# Patient Record
Sex: MALE | Race: WHITE | NOT HISPANIC OR LATINO | Employment: UNEMPLOYED | URBAN - METROPOLITAN AREA
[De-identification: names, ages, dates, MRNs, and addresses within clinical notes are randomized per-mention and may not be internally consistent; named-entity substitution may affect disease eponyms.]

---

## 2019-08-05 LAB
ALT SERPL-CCNC: 19 U/L (ref 13–61)
AST SERPL-CCNC: 17 U/L (ref 15–37)
CREATININE (EXTERNAL): 1 MG/DL (ref 0.7–1.2)
GFR ESTIMATED (EXTERNAL): >60 ML/MIN/1.73M2
GLUCOSE (EXTERNAL): 85 MG/DL (ref 74–106)
POTASSIUM (EXTERNAL): 4.1 MMOL/L (ref 3.5–5)

## 2020-02-27 LAB
CREATININE (EXTERNAL): 1.2 MG/DL (ref 0.7–1.2)
GFR ESTIMATED (EXTERNAL): >60 ML/MIN/1.73M2
GLUCOSE (EXTERNAL): 90 MG/DL (ref 74–106)
POTASSIUM (EXTERNAL): 4.1 MMOL/L (ref 3.5–5)

## 2020-07-02 LAB
CREATININE (EXTERNAL): 1 MG/DL (ref 0.7–1.2)
GFR ESTIMATED (EXTERNAL): >60 ML/MIN/1.73M2
GLUCOSE (EXTERNAL): 87 MG/DL (ref 74–106)
POTASSIUM (EXTERNAL): 4.7 MMOL/L (ref 3.5–5)

## 2020-07-22 LAB — HIV 1&2 EXT: NORMAL

## 2021-06-07 LAB
CREATININE (EXTERNAL): 0.9 MG/DL (ref 0.7–1.2)
GFR ESTIMATED (EXTERNAL): >60 ML/MIN/1.73M2
GLUCOSE (EXTERNAL): 91 MG/DL (ref 74–106)
POTASSIUM (EXTERNAL): 4 MMOL/L (ref 3.5–5)

## 2021-08-23 ENCOUNTER — TRANSFERRED RECORDS (OUTPATIENT)
Dept: HEALTH INFORMATION MANAGEMENT | Facility: CLINIC | Age: 61
End: 2021-08-23

## 2022-07-18 LAB
CHOLESTEROL (EXTERNAL): 213 MG/DL (ref 0–200)
CREATININE (EXTERNAL): 1 MG/DL (ref 0.7–1.2)
GFR ESTIMATED (EXTERNAL): 86 ML/MIN/1.73M2
GLUCOSE (EXTERNAL): 97 MG/DL (ref 74–106)
HDLC SERPL-MCNC: 43 MG/DL
LDL CHOLESTEROL CALCULATED (EXTERNAL): 137 MG/DL
NON HDL CHOLESTEROL (EXTERNAL): 170 MG/DL
POTASSIUM (EXTERNAL): 4.3 MMOL/L (ref 3.5–5)
TRIGLYCERIDES (EXTERNAL): 166 MG/DL

## 2023-01-09 ENCOUNTER — TRANSFERRED RECORDS (OUTPATIENT)
Dept: HEALTH INFORMATION MANAGEMENT | Facility: CLINIC | Age: 63
End: 2023-01-09

## 2023-01-09 LAB
CREATININE (EXTERNAL): 1 MG/DL (ref 0.7–1.2)
GFR ESTIMATED (EXTERNAL): 85 ML/MIN/1.73M2
GLUCOSE (EXTERNAL): 99 MG/DL (ref 74–106)
INR (EXTERNAL): 1 (ref 0.8–1.1)
POTASSIUM (EXTERNAL): 4.9 MMOL/L (ref 3.5–5)

## 2023-03-22 ENCOUNTER — TRANSFERRED RECORDS (OUTPATIENT)
Dept: HEALTH INFORMATION MANAGEMENT | Facility: CLINIC | Age: 63
End: 2023-03-22

## 2023-05-14 ENCOUNTER — TRANSFERRED RECORDS (OUTPATIENT)
Dept: HEALTH INFORMATION MANAGEMENT | Facility: CLINIC | Age: 63
End: 2023-05-14

## 2023-06-02 ENCOUNTER — TRANSCRIBE ORDERS (OUTPATIENT)
Dept: OTHER | Age: 63
End: 2023-06-02

## 2023-06-02 DIAGNOSIS — K62.82 DYSPLASIA OF ANUS: Primary | ICD-10-CM

## 2023-06-05 ENCOUNTER — TRANSFERRED RECORDS (OUTPATIENT)
Dept: HEALTH INFORMATION MANAGEMENT | Facility: CLINIC | Age: 63
End: 2023-06-05

## 2023-06-05 ENCOUNTER — TELEPHONE (OUTPATIENT)
Dept: OTHER | Age: 63
End: 2023-06-05

## 2023-06-05 ENCOUNTER — PRE VISIT (OUTPATIENT)
Dept: SURGERY | Facility: CLINIC | Age: 63
End: 2023-06-05

## 2023-06-05 ENCOUNTER — TELEPHONE (OUTPATIENT)
Dept: SURGERY | Facility: CLINIC | Age: 63
End: 2023-06-05

## 2023-06-05 NOTE — TELEPHONE ENCOUNTER
[facility] Call Center    Phone Message    May a detailed message be left on voicemail: yes     Reason for Call: Appointment Intake    Referring Provider Name: Lalo Salter @ [facility]  Diagnosis and/or Symptoms: Dysplasia of anus [K62.82]    Referring provider requesting Dr. Guadalupe - high resolution anoscopy for identification and treatment of anal high grade dysplasia    Sending TE per guidelines    Action Taken: Message routed to:  [facility] CRS    Travel Screening: Not Applicable

## 2023-06-05 NOTE — TELEPHONE ENCOUNTER
Diagnosis, Referred by & from: Microscopy   Appt date: N/A yet   NOTES STATUS DETAILS   OFFICE NOTE from referring provider Received [facility]:  4/24/23 - CR OV with Dr. Salter   OFFICE NOTE from other specialist Received [facility]:  7/6/22 - CR OV with Dr. Garcia  8/26/21 - URO OV with Dr. Bird   DISCHARGE SUMMARY from hospital N/A    DISCHARGE REPORT from the ER N/A    OPERATIVE REPORT Received [facility]:  3/22/23 - OP Note for EXCISION OF PERIANAL LESION with Dr. Zaldivar  1/31/23 - OP Note for FULGERATION OF ANAL CONDYLOMA WITH BIOPSY with Dr Zaldivar  6/9/21 - OP Note for FULGERATION OF ANAL CONDYLOMA with Dr. Thomas  7/7/20 - OP Note for EXCISIONAL BIOPSY OF ANAL CONDYLOMA with Dr. Jensen  3/11/20 - OP Note for FULGERATION OF ANAL CONDYLOMA with Dr. Woodruff   MEDICATION LIST Received    LABS     ANAL PAP/CEA Received [facility]:  7/24/20 - Anal Pap (Case: BF-KH-)   BIOPSIES/PATHOLOGY RELATED TO DIAGNOSIS Received [facility]:  3/22/23 - Anal Biopsy (Case: NT-BY-)  1/31/23 - Anal Biopsy (Case: NA-RM-)  9/16/21 - Colon Biopsy (Case: BP-CT-)  7/21/21 - Anal Biopsy (Case: NO-TJ-)  6/9/21 - Anal Biopsy (Case: BZ-XO-)  7/7/20 - Anal Biopsy (Case: BS-ZI-)  3/11/20 - Anal Biopsy (Case: ZA-TB-)   DIAGNOSTIC PROCEDURES     PFC TESTING (from the Pelvic floor center includes Manometry, PDNL, EMG, etc.) N/A    COLONOSCOPY Received [facility]:  9/16/21 - Colonoscopy   UPPER ENDOSCOPY (EGD) N/A    FLEX SIGMOIDOSCOPY N/A    ERCP N/A    IMAGING (DISC & REPORT)      CT N/A    MRI N/A    XRAY N/A    ULTRASOUND  (ENDOANAL/ENDORECTAL) N/A      Records Requested  06/05/23    [facility]  Fax: [phone]   Outcome * 6/5/23 9:17 AM Faxed urg req to [facility] for records to be faxed to the clinic. - Ryann    * 6/5/23 1:12 PM Records received from [facility] and sent to HIM to be scanned into the chart. - Ryann

## 2023-06-05 NOTE — TELEPHONE ENCOUNTER
LVM for scheduling  With: Eugenie Barger NP   Referring Provider: Dr. Salter   For / Appt Notes[facility]   Appt Type: Microscopy   Appt Date/Time: next available

## 2023-08-18 ENCOUNTER — TELEPHONE (OUTPATIENT)
Dept: SURGERY | Facility: CLINIC | Age: 63
End: 2023-08-18

## 2023-08-25 ENCOUNTER — OFFICE VISIT (OUTPATIENT)
Dept: SURGERY | Facility: CLINIC | Age: 63
End: 2023-08-25
Attending: SURGERY
Payer: COMMERCIAL

## 2023-08-25 DIAGNOSIS — A63.0 ANAL CONDYLOMA: Primary | ICD-10-CM

## 2023-08-25 DIAGNOSIS — K62.82 DYSPLASIA OF ANUS: ICD-10-CM

## 2023-08-25 LAB
LAB DIRECTOR COMMENTS: ABNORMAL
LAB DIRECTOR DISCLAIMER: ABNORMAL
LAB DIRECTOR INTERPRETATION: ABNORMAL
LAB DIRECTOR METHODOLOGY: ABNORMAL
LAB DIRECTOR RESULTS: ABNORMAL
SPECIMEN DESCRIPTION: ABNORMAL

## 2023-08-25 PROCEDURE — 88305 TISSUE EXAM BY PATHOLOGIST: CPT | Mod: 26 | Performed by: PATHOLOGY

## 2023-08-25 PROCEDURE — 88305 TISSUE EXAM BY PATHOLOGIST: CPT | Mod: TC | Performed by: NURSE PRACTITIONER

## 2023-08-25 PROCEDURE — 88112 CYTOPATH CELL ENHANCE TECH: CPT | Mod: TC | Performed by: NURSE PRACTITIONER

## 2023-08-25 PROCEDURE — 99203 OFFICE O/P NEW LOW 30 MIN: CPT | Mod: 25 | Performed by: NURSE PRACTITIONER

## 2023-08-25 PROCEDURE — 88112 CYTOPATH CELL ENHANCE TECH: CPT | Mod: 26 | Performed by: PATHOLOGY

## 2023-08-25 PROCEDURE — 46910 DESTRUCTION ANAL LESION(S): CPT | Performed by: NURSE PRACTITIONER

## 2023-08-25 PROCEDURE — G0452 MOLECULAR PATHOLOGY INTERPR: HCPCS | Mod: 26 | Performed by: STUDENT IN AN ORGANIZED HEALTH CARE EDUCATION/TRAINING PROGRAM

## 2023-08-25 PROCEDURE — 46607 DIAGNOSTIC ANOSCOPY & BIOPSY: CPT | Performed by: NURSE PRACTITIONER

## 2023-08-25 PROCEDURE — 87624 HPV HI-RISK TYP POOLED RSLT: CPT | Performed by: NURSE PRACTITIONER

## 2023-08-25 RX ORDER — LIDOCAINE HYDROCHLORIDE 20 MG/ML
JELLY TOPICAL ONCE
Status: COMPLETED | OUTPATIENT
Start: 2023-08-25 | End: 2023-08-25

## 2023-08-25 RX ADMIN — LIDOCAINE HYDROCHLORIDE 1 TUBE: 20 JELLY TOPICAL at 12:51

## 2023-08-25 NOTE — LETTER
2023       RE: Damir Chase  [address] [address]     Dear Colleague,    Thank you for referring your patient, Damir Chase, to the [facility] RECTAL [facility]GERY [facility] [place] at [facility]. Please see a copy of my visit note below.    Colon and Rectal Surgery Clinic High Resolution Anoscopy Note    RE: Damir Chase  : 1960  ERASTO: 2023      Damir Chase is a 63 year old male with a history of anal warts/LSIL who presents today for high resolution anoscopy.     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ianal [facility] that showed condyloma/LSIL. He denies any immunosuppression. He does not smoke. No anal intercourse.    ASSESSMENT: Written, informed consent was obtained prior to procedure.  Prior to the start of the procedure and with procedural staff participation, I verbally confirmed the patient s identity using two indicators, relevant allergies, that the procedure was appropriate and matched the consent or emergent situation, and that the correct equipment/implants were available. Immediately prior to starting the procedure I conducted the Time Out with the procedural staff and re-confirmed the patient s name, procedure, and site/side. (The Joint Commission universal protocol was followed.)  Yes    Sedation (Moderate or Deep): None    Anal cytology was obtained with Dacron swab. Digital anal rectal exam was performed with no palpable lesions. Dilute acetic acid soak was completed for 2 minutes. Lubricant was used to insert the anoscope. Performed high resolution microscopy using the Proctostation. The anal transition zone was viewed in its entirety. Abnormal areas noted were two small verruciform lesions with acetowhitening in the left posterior and left anterior perianal positions. No intra anal abnormalities.  After injection with 1% lidocaine with epinephrine, biopsies were obtained in the left posterior and left anterior perianal positions using a baby Tischler forceps. Entire lesion was destroyed with cautery.   The perianal area was inspected after acetic acid soak. The findings noted were no additional acetowhitening or punctation.    The patient tolerated the procedures well.    PLAN: Will follow up with patient with results of biopsies, anal Pap, and HPV genotyping today for follow up plan.      For details of past medical history, surgical history, family history, medications, allergies, and review of systems, please see details below.    Medical history:  Past Medical History:   Diagnosis Date    Depression        Surgical history:  No past surgical history on file.    Family history:  No family history on file.    Medications:  Current Outpatient Medications   Medication Sig Dispense Refill    finasteride (PROSCAR) 5 MG tablet Take 5 mg by mouth daily.      Ibuprofen (IBU PO) Take  by mouth as needed.      terazosin (HYTRIN) 2 MG capsule Take 2 mg by mouth At Bedtime.       Allergies:  The patienthas No Known Allergies.    Social history:  Social History     Tobacco Use    Smoking status: Never    Smokeless tobacco: Never   Substance Use Topics    Alcohol use: Yes     Comment: very rare     Marital status: single.    Review of Systems:  There are no exam notes on file for this visit.     This procedure was performed under a collaborative agreement with Dr. Kalin Carlson MD, Chief of the Division of Colon and Rectal Surgery      This note was created using speech recognition software and may contain unintended word substitutions.          Again, thank you for allowing me to participate in the care of your patient.      Sincerely,    MARIANO Iyer CNP

## 2023-08-25 NOTE — PROGRESS NOTES
Colon and Rectal Surgery Clinic High Resolution Anoscopy Note    RE: Damir Chase  : 1960  ERASTO: 2023      Damir Chase is a 63 year old male with a history of anal warts/LSIL who presents today for high resolution anoscopy.     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ian[facility] that showed condyloma/LSIL. He denies any immunosuppression. He does not smoke. No anal intercourse.    ASSESSMENT: Written, informed consent was obtained prior to procedure.  Prior to the start of the procedure and with procedural staff participation, I verbally confirmed the patient s identity using two indicators, relevant allergies, that the procedure was appropriate and matched the consent or emergent situation, and that the correct equipment/implants were available. Immediately prior to starting the procedure I conducted the Time Out with the procedural staff and re-confirmed the patient s name, procedure, and site/side. (The Joint Commission universal protocol was followed.)  Yes    Sedation (Moderate or Deep): None    Anal cytology was obtained with Dacron swab. Digital anal rectal exam was performed with no palpable lesions. Dilute acetic acid soak was completed for 2 minutes. Lubricant was used to insert the anoscope. Performed high resolution microscopy using the Proctostation. The anal transition zone was viewed in its entirety. Abnormal areas noted were two small verruciform lesions with acetowhitening in the left posterior and left anterior perianal positions. No intra anal abnormalities. After injection with 1% lidocaine with epinephrine, biopsies were obtained in the left posterior and left anterior perianal positions using a baby Tischler forceps. Entire lesion was destroyed with cautery.   The perianal area was inspected after acetic acid soak. The findings noted were no additional acetowhitening or punctation.    The  patient tolerated the procedures well.    PLAN: Will follow up with patient with results of biopsies, anal Pap, and HPV genotyping today for follow up plan.      For details of past medical history, surgical history, family history, medications, allergies, and review of systems, please see details below.    Medical history:  Past Medical History:   Diagnosis Date    Depression        Surgical history:  No past surgical history on file.    Family history:  No family history on file.    Medications:  Current Outpatient Medications   Medication Sig Dispense Refill    finasteride (PROSCAR) 5 MG tablet Take 5 mg by mouth daily.      Ibuprofen (IBU PO) Take  by mouth as needed.      terazosin (HYTRIN) 2 MG capsule Take 2 mg by mouth At Bedtime.       Allergies:  The patienthas No Known Allergies.    Social history:  Social History     Tobacco Use    Smoking status: Never    Smokeless tobacco: Never   Substance Use Topics    Alcohol use: Yes     Comment: very rare     Marital status: single.    Review of Systems:  There are no exam notes on file for this visit.     This procedure was performed under a collaborative agreement with Dr. Kalin Carlson MD, Chief of the Division of Colon and Rectal Surgery    Eugenie Marroquin NP-RYAN  Colon and Rectal Surgery  [facility]      This note was created using speech recognition software and may contain unintended word substitutions.

## 2023-08-28 LAB
PATH REPORT.COMMENTS IMP SPEC: NORMAL
PATH REPORT.FINAL DX SPEC: NORMAL
PATH REPORT.FINAL DX SPEC: NORMAL
PATH REPORT.GROSS SPEC: NORMAL
PATH REPORT.GROSS SPEC: NORMAL
PATH REPORT.MICROSCOPIC SPEC OTHER STN: NORMAL
PATH REPORT.MICROSCOPIC SPEC OTHER STN: NORMAL
PATH REPORT.RELEVANT HX SPEC: NORMAL
PATH REPORT.RELEVANT HX SPEC: NORMAL
PHOTO IMAGE: NORMAL

## 2023-09-15 ENCOUNTER — TELEPHONE (OUTPATIENT)
Dept: SURGERY | Facility: CLINIC | Age: 63
End: 2023-09-15

## 2023-09-15 NOTE — TELEPHONE ENCOUNTER
I received a call from the patient asking about results from HRA with [facility] [place]. Provider and staff had been attempting to call patient but neither phone number listed worked (no updated). After updating contact information for the patient, I answered all questions regarding the HRA appointment/anal pap/ hpv genotyping. Patient's questions were all answered to his satisfaction. Patient was scheduled for an annual HRA with [facility].

## 2024-06-25 ENCOUNTER — TELEPHONE (OUTPATIENT)
Dept: SURGERY | Facility: CLINIC | Age: 64
End: 2024-06-25

## 2024-06-25 NOTE — TELEPHONE ENCOUNTER
Left Voicemail (1st Attempt), no myc for the patient to call back and schedule the following:    Appointment type: Microscopy  Provider: Eugenie Barger  Return date: Reschedule 9/13 Appt  Specialty phone number: [phone]  Additional appointment(s) needed: n/a  Additonal Notes: Reschedule to next available appt    Left direct #

## 2024-06-27 NOTE — TELEPHONE ENCOUNTER
Pt called back to reschedule    Patient confirmed scheduled appointment:  Date: 9/6/24  Time: 11:45 am  Visit type: Microscopy  Provider: Eugenie Barger  Location: [facility]  Testing/imaging: n/a  Additional notes: rescheduled 9/13 appt

## 2024-08-13 ENCOUNTER — MYC MEDICAL ADVICE (OUTPATIENT)
Dept: SURGERY | Facility: CLINIC | Age: 64
End: 2024-08-13

## 2024-09-06 ENCOUNTER — OFFICE VISIT (OUTPATIENT)
Dept: SURGERY | Facility: CLINIC | Age: 64
End: 2024-09-06
Payer: COMMERCIAL

## 2024-09-06 VITALS — DIASTOLIC BLOOD PRESSURE: 94 MMHG | SYSTOLIC BLOOD PRESSURE: 150 MMHG | OXYGEN SATURATION: 98 % | HEART RATE: 55 BPM

## 2024-09-06 DIAGNOSIS — K62.82 DYSPLASIA OF ANUS: Primary | ICD-10-CM

## 2024-09-06 PROCEDURE — 99213 OFFICE O/P EST LOW 20 MIN: CPT | Mod: 25 | Performed by: NURSE PRACTITIONER

## 2024-09-06 PROCEDURE — 87624 HPV HI-RISK TYP POOLED RSLT: CPT | Performed by: NURSE PRACTITIONER

## 2024-09-06 PROCEDURE — 88305 TISSUE EXAM BY PATHOLOGIST: CPT | Mod: TC | Performed by: NURSE PRACTITIONER

## 2024-09-06 PROCEDURE — 46607 DIAGNOSTIC ANOSCOPY & BIOPSY: CPT | Performed by: NURSE PRACTITIONER

## 2024-09-06 PROCEDURE — 88112 CYTOPATH CELL ENHANCE TECH: CPT | Mod: TC | Performed by: NURSE PRACTITIONER

## 2024-09-06 PROCEDURE — G0452 MOLECULAR PATHOLOGY INTERPR: HCPCS | Mod: 26 | Performed by: PATHOLOGY

## 2024-09-06 PROCEDURE — 88305 TISSUE EXAM BY PATHOLOGIST: CPT | Mod: 26 | Performed by: PATHOLOGY

## 2024-09-06 PROCEDURE — 88112 CYTOPATH CELL ENHANCE TECH: CPT | Mod: 26 | Performed by: PATHOLOGY

## 2024-09-06 PROCEDURE — 46910 DESTRUCTION ANAL LESION(S): CPT | Performed by: NURSE PRACTITIONER

## 2024-09-06 RX ORDER — PRAVASTATIN SODIUM 40 MG
1 TABLET ORAL EVERY EVENING
COMMUNITY

## 2024-09-06 RX ORDER — PRAMIPEXOLE DIHYDROCHLORIDE 0.5 MG/1
0.5 TABLET ORAL
COMMUNITY

## 2024-09-06 RX ORDER — LIDOCAINE HYDROCHLORIDE 20 MG/ML
JELLY TOPICAL ONCE
Status: COMPLETED | OUTPATIENT
Start: 2024-09-06 | End: 2024-09-06

## 2024-09-06 RX ORDER — IODINE AND POTASSIUM IODIDE 50; 100 MG/ML; MG/ML
LIQUID ORAL ONCE
Status: COMPLETED | OUTPATIENT
Start: 2024-09-06 | End: 2024-09-06

## 2024-09-06 RX ORDER — TAMSULOSIN HYDROCHLORIDE 0.4 MG/1
1 CAPSULE ORAL EVERY EVENING
COMMUNITY

## 2024-09-06 RX ADMIN — LIDOCAINE HYDROCHLORIDE: 20 JELLY TOPICAL at 12:14

## 2024-09-06 RX ADMIN — IODINE AND POTASSIUM IODIDE 1 ML: 50; 100 LIQUID ORAL at 12:14

## 2024-09-06 ASSESSMENT — PAIN SCALES - GENERAL: PAINLEVEL: MILD PAIN (2)

## 2024-09-06 NOTE — PROGRESS NOTES
Colon and Rectal Surgery Clinic High Resolution Anoscopy Note    RE: Damir Chase  : 1960  ERASTO: 2024    Damir Chase is a 64 year old male with a history of anal warts/LSIL who presents today for high resolution anoscopy.     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 of [facility]ian[facility] that showed condyloma/LSIL. I saw him last for HRA on 23 with biopsies showing AIN 1 and anal Pap with low risk HPV 6.   He denies any immunosuppression. He does not smoke. No anal intercourse.    ASSESSMENT: Written, informed consent was obtained prior to procedure.  Prior to the start of the procedure and with procedural staff participation, I verbally confirmed the patient s identity using two indicators, relevant allergies, that the procedure was appropriate and matched the consent or emergent situation, and that the correct equipment/implants were available. Immediately prior to starting the procedure I conducted the Time Out with the procedural staff and re-confirmed the patient s name, procedure, and site/side. (The Joint Commission universal protocol was followed.)  Yes    Sedation (Moderate or Deep): None    Anal cytology was obtained with Dacron swab. Digital anal rectal exam was performed with no palpable lesions. Dilute acetic acid soak was completed for 2 minutes. Lubricant was used to insert the anoscope. Performed high resolution microscopy using the Proctostation. The anal transition zone was viewed in its entirety. Abnormal areas noted were two small verruciform lesions in the right and left posterior positions in the distal anal canal. Some acetowhitening with punctation in the left posterior anal position at the anal verge and verruciform lesion in the right anterior perianal position. After injection with 1% lidocaine with epinephrine, biopsies were obtained in the left and right posterior positions using a baby  Tischler forceps and these lesions were destroyed with cautery. Biopsy was obtained in the left anterior anal verge and hemostasis was obtained using Monsel solution-this area was not destroyed as it was more diffuse. Additional biopsy obtained in the right anterior perianal position and this lesion was destroyed completely with cautery.  The perianal area was inspected after acetic acid soak. The findings noted were no additional acetowhitening or punctation.    The patient tolerated the procedures well.    PLAN: Will follow up with patient with results of biopsies, anal Pap, and HPV genotyping today for follow up plan. Patient's questions were answered to his stated satisfaction and he is in agreement with this plan.     For details of past medical history, surgical history, family history, medications, allergies, and review of systems, please see details below.    Medical history:  Past Medical History:   Diagnosis Date    Depression        Surgical history:  No past surgical history on file.    Family history:  No family history on file.    Medications:  Current Outpatient Medications   Medication Sig Dispense Refill    finasteride (PROSCAR) 5 MG tablet Take 5 mg by mouth daily.      Ibuprofen (IBU PO) Take  by mouth as needed.      terazosin (HYTRIN) 2 MG capsule Take 2 mg by mouth At Bedtime.       Allergies:  The patienthas No Known Allergies.    Social history:  Social History     Tobacco Use    Smoking status: Never    Smokeless tobacco: Never   Substance Use Topics    Alcohol use: Yes     Comment: very rare     Marital status: single.    Review of Systems:  There are no exam notes on file for this visit.     This procedure was performed under a collaborative agreement with Dr. Kalin Carlson MD, Chief of the Division of Colon and Rectal Surgery    Eugenie Marroquin, NP-C  Colon and Rectal Surgery  [facility]      This note was created using speech recognition software  and may contain unintended word substitutions.

## 2024-09-06 NOTE — LETTER
2024       RE: Damir Chase  [address]  [place] [address]     Dear Colleague,    Thank you for referring your patient, Damir Chase, to the [facility] COLON AND RECTAL SURGERY CLINIC [place] at [facility]. Please see a copy of my visit note below.    Colon and Rectal Surgery Clinic High Resolution Anoscopy Note    RE: Damir Chase  : 1960  ERASTO: 2024    Damir Chase is a 64 year old male with a history of anal warts/LSIL who presents today for high resolution anoscopy.     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 of [facility]ian[facility] lesion that showed condyloma/LSIL. I saw him last for HRA on 23 with biopsies showing AIN 1 and anal Pap with low risk HPV 6.   He denies any immunosuppression. He does not smoke. No anal intercourse.    ASSESSMENT: Written, informed consent was obtained prior to procedure.  Prior to the start of the procedure and with procedural staff participation, I verbally confirmed the patient s identity using two indicators, relevant allergies, that the procedure was appropriate and matched the consent or emergent situation, and that the correct equipment/implants were available. Immediately prior to starting the procedure I conducted the Time Out with the procedural staff and re-confirmed the patient s name, procedure, and site/side. (The Joint Commission universal protocol was followed.)  Yes    Sedation (Moderate or Deep): None    Anal cytology was obtained with Dacron swab. Digital anal rectal exam was performed with no palpable lesions. Dilute acetic acid soak was completed for 2 minutes. Lubricant was used to insert the anoscope. Performed high resolution microscopy using the Proctostation. The anal transition zone was viewed in its entirety. Abnormal areas noted were two small verruciform lesions in the right  and left posterior positions in the distal anal canal. Some acetowhitening with punctation in the left posterior anal position at the anal verge and verruciform lesion in the right anterior perianal position. After injection with 1% lidocaine with epinephrine, biopsies were obtained in the left and right posterior positions using a baby Tischler forceps and these lesions were destroyed with cautery. Biopsy was obtained in the left anterior anal verge and hemostasis was obtained using Monsel solution-this area was not destroyed as it was more diffuse. Additional biopsy obtained in the right anterior perianal position and this lesion was destroyed completely with cautery.  The perianal area was inspected after acetic acid soak. The findings noted were no additional acetowhitening or punctation.    The patient tolerated the procedures well.    PLAN: Will follow up with patient with results of biopsies, anal Pap, and HPV genotyping today for follow up plan. Patient's questions were answered to his stated satisfaction and he is in agreement with this plan.     For details of past medical history, surgical history, family history, medications, allergies, and review of systems, please see details below.    Medical history:  Past Medical History:   Diagnosis Date     Depression        Surgical history:  No past surgical history on file.    Family history:  No family history on file.    Medications:  Current Outpatient Medications   Medication Sig Dispense Refill     finasteride (PROSCAR) 5 MG tablet Take 5 mg by mouth daily.       Ibuprofen (IBU PO) Take  by mouth as needed.       terazosin (HYTRIN) 2 MG capsule Take 2 mg by mouth At Bedtime.       Allergies:  The patienthas No Known Allergies.    Social history:  Social History     Tobacco Use     Smoking status: Never     Smokeless tobacco: Never   Substance Use Topics     Alcohol use: Yes     Comment: very rare     Marital status: single.    Review of Systems:  There are  no exam notes on file for this visit.     This procedure was performed under a collaborative agreement with Dr. Kalin Carlson MD, Chief of the Division of Colon and Rectal Surgery    Eugenie Marroquin NP-C  Colon and Rectal Surgery  [facility]      This note was created using speech recognition software and may contain unintended word substitutions.      [facility] Informed Consent Process Documentation    Study Name: High Resolution Anoscopy (HRA) Registry    IRB#: [id number]    ICF Version Date:  02/15/2024 approved 04/19/2024    Date of Approach/Consent: 9/6/2024      The subject was screened and meets all of the inclusion criteria and none of the exclusion criteria is met.      The subject was told:  -that the study involves research   -the purpose of the research study  -the expected duration of the study and the approximate number of subject sought  -of procedures that are identified as experimental  -of reasonably foreseeable risks or discomforts to the subject  -of any benefits to the subject or others that may be expected from the research  -of alternative procedures and/or treatment  -how the confidentiality of records would be maintained  -whether or not compensation and medical treatments are available should injury occur as a result of the study  -who to contact if they have questions related to the research study or questions regarding research subjects' rights  -that participation is completely voluntary and that their decision to or not to participate will have no impact on their relationships with the [facility] and the staff    No study procedures were completed prior to the consent being obtained.  The use of historical information (lab or assessments) used for the purpose of the study was approved by subject.  The subject was fully aware that we would be reviewing their medical record for the study.    The subject demonstrated an understanding of what the study  involved.  Specifically, how this study differed from standard of care at our center and what was required of the subject as part of the study.    The subject reviewed the consent form and was given the opportunity to ask questions before signing.  Questions and concerns were answered by the study staff and/or study physician.      A copy of the signed informed consent document was provided to the subject.  [x] Yes [] No    The subject was offered a copy of the signed informed consent but declined. [] Yes [x] No    The consent required the use of a :   [] Yes []  No [x] NA    The subject required a legally-authorized representative (LAR) to sign on their behalf:                                                    [] Yes  [] No        [x] NA      Questions to Evaluate Subject Comprehension of Study:    Question: Adequate Response? If No, explain what actions were taken   What is being studied? [x] Yes  [] No   If you participate, what will be different than if you decide not to participate?  [x] Yes  [] No   How long will the study last; will you be required to return for visits?  [x] Yes  [] No   What kinds of risks are there?  [x] Yes  [] No   Do you understand that you can withdraw consent at any time and for any reason while participating in the study?   [x] Yes  [] No       :       Carlee [email or web address]  [phone]                             :           Azeb [email or web address]  [phone]      Again, thank you for allowing me to participate in the care of your patient.      Sincerely,    MARIANO Iyer CNP

## 2024-09-06 NOTE — NURSING NOTE
Chief Complaint   Patient presents with    Consult       Vitals:    09/06/24 1129   BP: (!) 150/94   BP Location: Left arm   Patient Position: Sitting   Cuff Size: Adult Regular   Pulse: 55   SpO2: 98%       There is no height or weight on file to calculate BMI.    Mk Contreras EMT-P

## 2024-09-06 NOTE — PROGRESS NOTES
[facility] Informed Consent Process Documentation    Study Name: High Resolution Anoscopy (HRA) Registry    IRB#: [id number]    ICF Version Date:  02/15/2024 approved 04/19/2024    Date of Approach/Consent: 9/6/2024      The subject was screened and meets all of the inclusion criteria and none of the exclusion criteria is met.      The subject was told:  -that the study involves research   -the purpose of the research study  -the expected duration of the study and the approximate number of subject sought  -of procedures that are identified as experimental  -of reasonably foreseeable risks or discomforts to the subject  -of any benefits to the subject or others that may be expected from the research  -of alternative procedures and/or treatment  -how the confidentiality of records would be maintained  -whether or not compensation and medical treatments are available should injury occur as a result of the study  -who to contact if they have questions related to the research study or questions regarding research subjects' rights  -that participation is completely voluntary and that their decision to or not to participate will have no impact on their relationships with the N and the staff    No study procedures were completed prior to the consent being obtained.  The use of historical information (lab or assessments) used for the purpose of the study was approved by subject.  The subject was fully aware that we would be reviewing their medical record for the study.    The subject demonstrated an understanding of what the study involved.  Specifically, how this study differed from standard of care at our center and what was required of the subject as part of the study.    The subject reviewed the consent form and was given the opportunity to ask questions before signing.  Questions and concerns were answered by the study staff and/or study physician.      A copy of the signed informed consent document was provided to  the subject.  [x] Yes [] No    The subject was offered a copy of the signed informed consent but declined. [] Yes [x] No    The consent required the use of a :   [] Yes []  No [x] NA    The subject required a legally-authorized representative (LAR) to sign on their behalf:                                                    [] Yes  [] No        [x] NA      Questions to Evaluate Subject Comprehension of Study:    Question: Adequate Response? If No, explain what actions were taken   What is being studied? [x] Yes  [] No   If you participate, what will be different than if you decide not to participate?  [x] Yes  [] No   How long will the study last; will you be required to return for visits?  [x] Yes  [] No   What kinds of risks are there?  [x] Yes  [] No   Do you understand that you can withdraw consent at any time and for any reason while participating in the study?   [x] Yes  [] No       :       [email or web address] [email or web address]  [phone]                             :           [email or web address]  [phone]

## 2024-10-02 ENCOUNTER — TRANSCRIBE ORDERS (OUTPATIENT)
Dept: OTHER | Age: 64
End: 2024-10-02

## 2024-10-02 ENCOUNTER — TELEPHONE (OUTPATIENT)
Dept: SURGERY | Facility: CLINIC | Age: 64
End: 2024-10-02

## 2024-10-02 DIAGNOSIS — K62.82 DYSPLASIA OF ANUS: Primary | ICD-10-CM

## 2024-10-02 NOTE — TELEPHONE ENCOUNTER
Left Voicemail (1st Attempt) and Sent Mychart (1st Attempt) for the patient to call back and schedule the following:    Appointment type: Microscopy   Provider: Eugenie Mejia NP   Return date: Next available   Specialty phone number: [phone]  Additional appointment(s) needed: n/a  Additonal Notes: HRA

## 2024-10-04 ENCOUNTER — TELEPHONE (OUTPATIENT)
Dept: SURGERY | Facility: CLINIC | Age: 64
End: 2024-10-04

## 2024-10-04 NOTE — TELEPHONE ENCOUNTER
Left Voicemail (1st Attempt) and Sent Mychart (1st Attempt) for the patient to call back and schedule the following:    Appointment type: Microscopy   Provider: Eugenie Mejia NP   Return date: Next Available   Specialty phone number: [phone]  Additional appointment(s) needed: n/a  Additonal Notes: HRA

## 2024-10-14 ENCOUNTER — MEDICAL CORRESPONDENCE (OUTPATIENT)
Dept: HEALTH INFORMATION MANAGEMENT | Facility: CLINIC | Age: 64
End: 2024-10-14

## 2024-10-27 ENCOUNTER — HEALTH MAINTENANCE LETTER (OUTPATIENT)
Age: 64
End: 2024-10-27

## 2025-07-03 ENCOUNTER — TELEPHONE (OUTPATIENT)
Dept: SURGERY | Facility: CLINIC | Age: 65
End: 2025-07-03

## 2025-07-03 NOTE — TELEPHONE ENCOUNTER
Left Voicemail (1st Attempt) and Sent Mychart (1st Attempt) for the patient to call back and schedule the following:      Please Schedule This Appointment:     With: Eugenie Barger NP     Referring Provider: SAEED     For / Appt Notes: HRA     Appt Type: Microscopy     Appt Date/Time: 9/2025

## 2025-07-09 ENCOUNTER — TELEPHONE (OUTPATIENT)
Dept: SURGERY | Facility: CLINIC | Age: 65
End: 2025-07-09

## 2025-07-09 NOTE — TELEPHONE ENCOUNTER
Left Voicemail (2nd Attempt) for the patient to call back and schedule the following:    Appointment type: HRA   Provider: JUAN ANTONIO   Return date: Sept 2025  Specialty phone number: [phone]  Additional appointment(s) needed:   Additonal Notes:

## 2025-08-17 ENCOUNTER — HEALTH MAINTENANCE LETTER (OUTPATIENT)
Age: 65
End: 2025-08-17

## (undated) RX ORDER — ACETIC ACID 5 %
LIQUID (ML) MISCELLANEOUS
Status: DISPENSED
Start: 2023-08-25

## (undated) RX ORDER — FERRIC SUBSULFATE 0.21 G/G
LIQUID TOPICAL
Status: DISPENSED
Start: 2024-09-06

## (undated) RX ORDER — LIDOCAINE HYDROCHLORIDE 20 MG/ML
JELLY TOPICAL
Status: DISPENSED
Start: 2024-09-06

## (undated) RX ORDER — LIDOCAINE HYDROCHLORIDE 20 MG/ML
JELLY TOPICAL
Status: DISPENSED
Start: 2023-08-25

## (undated) RX ORDER — ACETIC ACID 5 %
LIQUID (ML) MISCELLANEOUS
Status: DISPENSED
Start: 2024-09-06

## (undated) RX ORDER — FERRIC SUBSULFATE 0.21 G/G
LIQUID TOPICAL
Status: DISPENSED
Start: 2023-08-25